# Patient Record
Sex: FEMALE | Race: BLACK OR AFRICAN AMERICAN | NOT HISPANIC OR LATINO | URBAN - METROPOLITAN AREA
[De-identification: names, ages, dates, MRNs, and addresses within clinical notes are randomized per-mention and may not be internally consistent; named-entity substitution may affect disease eponyms.]

---

## 2018-09-22 ENCOUNTER — EMERGENCY (EMERGENCY)
Facility: HOSPITAL | Age: 23
LOS: 0 days | Discharge: HOME | End: 2018-09-22
Attending: EMERGENCY MEDICINE
Payer: COMMERCIAL

## 2018-09-22 VITALS
WEIGHT: 164.91 LBS | HEART RATE: 72 BPM | SYSTOLIC BLOOD PRESSURE: 112 MMHG | OXYGEN SATURATION: 100 % | HEIGHT: 64 IN | DIASTOLIC BLOOD PRESSURE: 65 MMHG | RESPIRATION RATE: 16 BRPM | TEMPERATURE: 98 F
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
## 2018-09-22 DIAGNOSIS — Z91.040 LATEX ALLERGY STATUS: ICD-10-CM

## 2018-09-22 DIAGNOSIS — Z98.890 OTHER SPECIFIED POSTPROCEDURAL STATES: Chronic | ICD-10-CM

## 2018-09-22 DIAGNOSIS — J45.909 UNSPECIFIED ASTHMA, UNCOMPLICATED: ICD-10-CM

## 2018-09-22 DIAGNOSIS — S01.81XA LACERATION WITHOUT FOREIGN BODY OF OTHER PART OF HEAD, INITIAL ENCOUNTER: ICD-10-CM
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
## 2018-09-22 PROCEDURE — 99283 EMERGENCY DEPT VISIT LOW MDM: CPT | Mod: 25

## 2018-09-22 PROCEDURE — 12011 RPR F/E/E/N/L/M 2.5 CM/<: CPT

## 2018-09-22 NOTE — ED ADULT NURSE NOTE - NSIMPLEMENTINTERV_GEN_ALL_ED
Implemented All Universal Safety Interventions:  [place] to call system. Call bell, personal items and telephone within reach. Instruct patient to call for assistance. Room bathroom lighting operational. Non-slip footwear when patient is off stretcher. Physically safe environment: no spills, clutter or unnecessary equipment. Stretcher in lowest position, wheels locked, appropriate side rails in place.

## 2018-09-22 NOTE — ED PROVIDER NOTE - MEDICAL DECISION MAKING DETAILS
laceration repaired. patient up to date with vaccinations. she is now discharged with care instructions. patient is to follow up with pmd. Discussed results and outcome of testing with the patient.  Patient advised to please follow up with their primary care doctor within the next 24 hours and return to the Emergency Department for worsening symptoms or any other concerns.  Patient advised that their doctor may call  to follow up on the specific results of the tests performed today in the emergency department.

## 2018-09-22 NOTE — ED PROVIDER NOTE - OBJECTIVE STATEMENT
Pertinent PMH/PSH/FHx/SHx and Review of Systems contained within:  23 yo f with no pmh presents in ED c/o chin laceration from glass that broke on floor. no loc, no head trauma. No aggravating or relieving factors, No fever/chills, No photophobia/eye pain/changes in vision, No ear pain/sore throat/dysphagia, No chest pain/palpitations, no SOB/cough/wheeze/stridor, No abdominal pain, No N/V/D, no dysuria/frequency/discharge, No neck/back pain, no rash, no changes in neurological status/function.

## 2018-09-22 NOTE — ED ADULT NURSE NOTE - OBJECTIVE STATEMENT
patient  presents in ED after falling at work at 02.50hrs,  and sustaining a laceration to her chin from glass on the floor. Pain 8/10

## 2018-09-22 NOTE — ED ADULT TRIAGE NOTE - CHIEF COMPLAINT QUOTE
pt sustained laceration to chin area after she fell while holding a glass, incident occurred at approximately 0250hrs.  pt also c/o L-wrist pain.   denies LOC.   pt states she is up to date on tetanus

## 2018-09-27 ENCOUNTER — EMERGENCY (EMERGENCY)
Facility: HOSPITAL | Age: 23
LOS: 0 days | Discharge: HOME | End: 2018-09-27
Attending: EMERGENCY MEDICINE

## 2018-09-27 VITALS
WEIGHT: 167.99 LBS | HEART RATE: 72 BPM | OXYGEN SATURATION: 100 % | HEIGHT: 64.5 IN | RESPIRATION RATE: 17 BRPM | TEMPERATURE: 98 F

## 2018-09-27 DIAGNOSIS — Z98.890 OTHER SPECIFIED POSTPROCEDURAL STATES: Chronic | ICD-10-CM

## 2018-09-27 DIAGNOSIS — Z48.02 ENCOUNTER FOR REMOVAL OF SUTURES: ICD-10-CM

## 2018-09-27 PROBLEM — J45.909 UNSPECIFIED ASTHMA, UNCOMPLICATED: Chronic | Status: ACTIVE | Noted: 2018-09-22

## 2018-09-27 NOTE — ED ADULT NURSE NOTE - OBJECTIVE STATEMENT
Patient is here for suture removal from her chin area, no signs of wound infection noted, denies any pain

## 2018-09-27 NOTE — ED PROVIDER NOTE - OBJECTIVE STATEMENT
23 years old female walked in request 4 sutures removal from the left side of chin. Pt sts she was here for lac to the area hand 4 sutures on 9/23/18, Pt denies headache, dizziness, nausea, vomiting, fever, chills, abd pain, drainage from the wound. Pt sts her tetanus was up to the date.

## 2023-06-29 NOTE — ED ADULT TRIAGE NOTE - RESPIRATORY RATE (BREATHS/MIN)
[FreeTextEntry1] : Mrs Rachel Ashton is a 73-year-old retired [employer] employee presents with a new onset of urinary frequency and urgency.  She denies any hematuria dysuria.  She denies incontinence although she does have urgency.  She denies stress incontinence.\par She is followed by nephrology with stage III CKD and myelofibrosis.  Renal function is stable.\par On physical examination abdomen is soft benign she has no postvoid residual.  There is no bladder descensus.\par I questioning the patient endorses the fact that she drinks significant amounts of fluids.  She was unable to quantify this.  We discussed proceeding with a voiding diaries so that we can assess her current symptoms.  We did discuss the physiology of antidiuretic hormone and reduction in production with increasing age and its impact on urinary symptoms.\par We discussed the potential for medical management.  We also discussed the potential utilization of Botox for bladder function.  \par Prior to embarking on any treatment strategy a voiding diary will be obtained \par Additionally urinalysis culture and cytology will be obtained \par The RACHEL ASHTON  expressed fully understanding of the information provided, the consequences and the management.\par Plan:\par urinalysis\par urine culture\par urine cytology\par voiding diary \par followup to review and discuss management\par Plan\par \par \par \par \par 
17